# Patient Record
Sex: FEMALE | Race: WHITE | NOT HISPANIC OR LATINO | Employment: FULL TIME | URBAN - METROPOLITAN AREA
[De-identification: names, ages, dates, MRNs, and addresses within clinical notes are randomized per-mention and may not be internally consistent; named-entity substitution may affect disease eponyms.]

---

## 2023-02-06 ENCOUNTER — HOSPITAL ENCOUNTER (OUTPATIENT)
Facility: HOSPITAL | Age: 38
Discharge: HOME | End: 2023-02-06
Attending: EMERGENCY MEDICINE
Payer: COMMERCIAL

## 2023-02-06 VITALS
SYSTOLIC BLOOD PRESSURE: 137 MMHG | TEMPERATURE: 97.9 F | HEART RATE: 74 BPM | DIASTOLIC BLOOD PRESSURE: 90 MMHG | OXYGEN SATURATION: 97 % | RESPIRATION RATE: 18 BRPM | HEIGHT: 63 IN | WEIGHT: 193 LBS | BODY MASS INDEX: 34.2 KG/M2

## 2023-02-06 DIAGNOSIS — M62.838 MUSCLE SPASM: ICD-10-CM

## 2023-02-06 DIAGNOSIS — M54.9 BACK PAIN, UNSPECIFIED BACK LOCATION, UNSPECIFIED BACK PAIN LATERALITY, UNSPECIFIED CHRONICITY: Primary | ICD-10-CM

## 2023-02-06 LAB
BILIRUB UR QL STRIP: NEGATIVE
CLARITY UR: ABNORMAL
COLOR UR: YELLOW
GLUCOSE UR STRIP-MCNC: NEGATIVE MG/DL
HGB UR QL STRIP.AUTO: NEGATIVE
KETONES UR QL STRIP: NEGATIVE
LEUKOCYTE ESTERASE UR QL STRIP.AUTO: NEGATIVE
NITRITE UR QL STRIP: NEGATIVE
PH UR STRIP.AUTO: 6 [PH] (ref 5–8)
PROT UR QL STRIP: ABNORMAL
SP GR UR STRIP: 1.02 (ref 1–1.03)
UROBILINOGEN UR QL STRIP: ABNORMAL

## 2023-02-06 PROCEDURE — 81003 URINALYSIS AUTO W/O SCOPE: CPT | Performed by: EMERGENCY MEDICINE

## 2023-02-06 PROCEDURE — 99203 OFFICE O/P NEW LOW 30 MIN: CPT | Performed by: EMERGENCY MEDICINE

## 2023-02-06 PROCEDURE — G0463 HOSPITAL OUTPT CLINIC VISIT: HCPCS | Performed by: EMERGENCY MEDICINE

## 2023-02-06 PROCEDURE — S9083 URGENT CARE CENTER GLOBAL: HCPCS | Performed by: EMERGENCY MEDICINE

## 2023-02-06 RX ORDER — NAPROXEN 500 MG/1
500 TABLET ORAL 2 TIMES DAILY WITH MEALS
Qty: 10 TABLET | Refills: 0 | Status: SHIPPED | OUTPATIENT
Start: 2023-02-06 | End: 2023-02-11

## 2023-02-06 RX ORDER — CYCLOBENZAPRINE HCL 5 MG
5-10 TABLET ORAL 3 TIMES DAILY PRN
Qty: 20 TABLET | Refills: 0 | Status: SHIPPED | OUTPATIENT
Start: 2023-02-06

## 2023-02-06 NOTE — FSED PROVIDER NOTE
Subjective   History of Present Illness  Patient states that she woke up yesterday morning with lower mid thoracic back pain.  Seems more lateral than midline.  Denies any injury or trauma.  Does not radiate.  No anterior chest symptoms.  No lower back or neck symptoms.  She does work in production and feels as if she would like a work note to go back tomorrow as this issue is bothersome.  It is possible that this may have occurred from work but does not appear to be an obvious answer.  No fevers or IV drug use history reported        Review of Systems   All other systems reviewed and are negative.      History reviewed. No pertinent past medical history.    Allergies   Allergen Reactions   • Bactrim [Sulfamethoxazole-Trimethoprim] Hives       History reviewed. No pertinent surgical history.    History reviewed. No pertinent family history.    Social History     Socioeconomic History   • Marital status:            Objective   Physical Exam  Vitals and nursing note reviewed.   Constitutional:       Appearance: Normal appearance. She is obese.   HENT:      Head: Normocephalic.      Right Ear: External ear normal.      Left Ear: External ear normal.      Nose: Nose normal.   Eyes:      Conjunctiva/sclera: Conjunctivae normal.   Cardiovascular:      Rate and Rhythm: Normal rate.      Pulses: Normal pulses.   Pulmonary:      Effort: Pulmonary effort is normal. No respiratory distress.      Breath sounds: Normal breath sounds. No stridor. No wheezing or rhonchi.   Abdominal:      General: There is no distension.   Musculoskeletal:         General: Tenderness (Mid and lower thoracic paraspinal with spasm) present.      Cervical back: Normal range of motion.   Skin:     Findings: No rash.   Neurological:      Mental Status: She is alert and oriented to person, place, and time.   Psychiatric:         Mood and Affect: Mood normal.         Procedures           ED Course                                           Medical  Decision Making  Likely musculoskeletal.  Patient has palpatory tenderness to the thoracic region.  She has not really tried any medications for this yet.  Offered x-ray but declined.  Probably would not really add much value to diagnostic work-up or treatment management.  She was interested in a work note, Flexeril and Naprosyn though.  Return if worse.  She has no anterior chest symptoms.  Vital signs are good.    Back pain, unspecified back location, unspecified back pain laterality, unspecified chronicity: complicated acute illness or injury  Muscle spasm: complicated acute illness or injury  Risk  Prescription drug management.          Final diagnoses:   Back pain, unspecified back location, unspecified back pain laterality, unspecified chronicity   Muscle spasm       ED Disposition  ED Disposition     ED Disposition   Discharge    Condition   Stable    Comment   --             PATIENT CONNECTION - [place] [id number]  [phone]  Schedule an appointment as soon as possible for a visit   General Care / Primary Care / [address] Dr         Medication List      New Prescriptions    cyclobenzaprine 5 MG tablet  Commonly known as: FLEXERIL  Take 1-2 tablets by mouth 3 (Three) Times a Day As Needed for Muscle Spasms.     naproxen 500 MG EC tablet  Commonly known as: EC NAPROSYN  Take 1 tablet by mouth 2 (Two) Times a Day With Meals for 5 days.           Where to Get Your Medications      These medications were sent to [facility]/pharmacy #[id number] - [place], [place] - [address] [place] [address] - [phone]  - [phone] FX  [address] [place] [address] [place] [address]    Hours: 24-hours Phone: [phone]   · cyclobenzaprine 5 MG tablet  · naproxen 500 MG EC tablet

## 2023-05-18 ENCOUNTER — APPOINTMENT (OUTPATIENT)
Dept: CT IMAGING | Facility: HOSPITAL | Age: 38
End: 2023-05-18
Payer: COMMERCIAL

## 2023-05-18 ENCOUNTER — HOSPITAL ENCOUNTER (EMERGENCY)
Facility: HOSPITAL | Age: 38
Discharge: HOME | End: 2023-05-19
Attending: STUDENT IN AN ORGANIZED HEALTH CARE EDUCATION/TRAINING PROGRAM
Payer: COMMERCIAL

## 2023-05-18 DIAGNOSIS — D25.9 UTERINE LEIOMYOMA, UNSPECIFIED LOCATION: ICD-10-CM

## 2023-05-18 DIAGNOSIS — N93.9 VAGINAL BLEEDING: Primary | ICD-10-CM

## 2023-05-18 LAB
ALBUMIN SERPL-MCNC: 4.2 G/DL (ref 3.5–5.2)
ALBUMIN/GLOB SERPL: 1.2 G/DL
ALP SERPL-CCNC: 114 U/L (ref 39–117)
ALT SERPL W P-5'-P-CCNC: 37 U/L (ref 1–33)
ANION GAP SERPL CALCULATED.3IONS-SCNC: 10.5 MMOL/L (ref 5–15)
AST SERPL-CCNC: 31 U/L (ref 1–32)
B-HCG UR QL: NEGATIVE
BASOPHILS # BLD AUTO: 0.04 10*3/MM3 (ref 0–0.2)
BASOPHILS NFR BLD AUTO: 0.4 % (ref 0–1.5)
BILIRUB SERPL-MCNC: <0.2 MG/DL (ref 0–1.2)
BILIRUB UR QL STRIP: NEGATIVE
BUN SERPL-MCNC: 8 MG/DL (ref 6–20)
BUN/CREAT SERPL: 11.3 (ref 7–25)
CALCIUM SPEC-SCNC: 9.2 MG/DL (ref 8.6–10.5)
CHLORIDE SERPL-SCNC: 103 MMOL/L (ref 98–107)
CLARITY UR: CLEAR
CO2 SERPL-SCNC: 22.5 MMOL/L (ref 22–29)
COLOR UR: ABNORMAL
CREAT SERPL-MCNC: 0.71 MG/DL (ref 0.57–1)
DEPRECATED RDW RBC AUTO: 43.2 FL (ref 37–54)
EGFRCR SERPLBLD CKD-EPI 2021: 111.1 ML/MIN/1.73
EOSINOPHIL # BLD AUTO: 0.36 10*3/MM3 (ref 0–0.4)
EOSINOPHIL NFR BLD AUTO: 3.3 % (ref 0.3–6.2)
ERYTHROCYTE [DISTWIDTH] IN BLOOD BY AUTOMATED COUNT: 13.5 % (ref 12.3–15.4)
GLOBULIN UR ELPH-MCNC: 3.6 GM/DL
GLUCOSE SERPL-MCNC: 95 MG/DL (ref 65–99)
GLUCOSE UR STRIP-MCNC: NEGATIVE MG/DL
HCT VFR BLD AUTO: 39.8 % (ref 34–46.6)
HGB BLD-MCNC: 13.4 G/DL (ref 12–15.9)
HGB UR QL STRIP.AUTO: ABNORMAL
IMM GRANULOCYTES # BLD AUTO: 0.02 10*3/MM3 (ref 0–0.05)
IMM GRANULOCYTES NFR BLD AUTO: 0.2 % (ref 0–0.5)
KETONES UR QL STRIP: NEGATIVE
LEUKOCYTE ESTERASE UR QL STRIP.AUTO: NEGATIVE
LYMPHOCYTES # BLD AUTO: 3 10*3/MM3 (ref 0.7–3.1)
LYMPHOCYTES NFR BLD AUTO: 27.8 % (ref 19.6–45.3)
MCH RBC QN AUTO: 29.1 PG (ref 26.6–33)
MCHC RBC AUTO-ENTMCNC: 33.7 G/DL (ref 31.5–35.7)
MCV RBC AUTO: 86.5 FL (ref 79–97)
MONOCYTES # BLD AUTO: 0.68 10*3/MM3 (ref 0.1–0.9)
MONOCYTES NFR BLD AUTO: 6.3 % (ref 5–12)
NEUTROPHILS NFR BLD AUTO: 6.7 10*3/MM3 (ref 1.7–7)
NEUTROPHILS NFR BLD AUTO: 62 % (ref 42.7–76)
NITRITE UR QL STRIP: NEGATIVE
PH UR STRIP.AUTO: 7 [PH] (ref 5–8)
PLATELET # BLD AUTO: 363 10*3/MM3 (ref 140–450)
PMV BLD AUTO: 10.5 FL (ref 6–12)
POTASSIUM SERPL-SCNC: 3.4 MMOL/L (ref 3.5–5.2)
PROT SERPL-MCNC: 7.8 G/DL (ref 6–8.5)
PROT UR QL STRIP: ABNORMAL
RBC # BLD AUTO: 4.6 10*6/MM3 (ref 3.77–5.28)
SODIUM SERPL-SCNC: 136 MMOL/L (ref 136–145)
SP GR UR STRIP: 1.01 (ref 1–1.03)
UROBILINOGEN UR QL STRIP: ABNORMAL
WBC NRBC COR # BLD: 10.8 10*3/MM3 (ref 3.4–10.8)

## 2023-05-18 PROCEDURE — 81003 URINALYSIS AUTO W/O SCOPE: CPT | Performed by: STUDENT IN AN ORGANIZED HEALTH CARE EDUCATION/TRAINING PROGRAM

## 2023-05-18 PROCEDURE — 96375 TX/PRO/DX INJ NEW DRUG ADDON: CPT

## 2023-05-18 PROCEDURE — 25010000002 MORPHINE PER 10 MG: Performed by: STUDENT IN AN ORGANIZED HEALTH CARE EDUCATION/TRAINING PROGRAM

## 2023-05-18 PROCEDURE — 81025 URINE PREGNANCY TEST: CPT | Performed by: STUDENT IN AN ORGANIZED HEALTH CARE EDUCATION/TRAINING PROGRAM

## 2023-05-18 PROCEDURE — 85025 COMPLETE CBC W/AUTO DIFF WBC: CPT | Performed by: STUDENT IN AN ORGANIZED HEALTH CARE EDUCATION/TRAINING PROGRAM

## 2023-05-18 PROCEDURE — 25010000002 ONDANSETRON PER 1 MG: Performed by: STUDENT IN AN ORGANIZED HEALTH CARE EDUCATION/TRAINING PROGRAM

## 2023-05-18 PROCEDURE — 96374 THER/PROPH/DIAG INJ IV PUSH: CPT

## 2023-05-18 PROCEDURE — 80053 COMPREHEN METABOLIC PANEL: CPT | Performed by: STUDENT IN AN ORGANIZED HEALTH CARE EDUCATION/TRAINING PROGRAM

## 2023-05-18 PROCEDURE — 25010000002 KETOROLAC TROMETHAMINE PER 15 MG: Performed by: STUDENT IN AN ORGANIZED HEALTH CARE EDUCATION/TRAINING PROGRAM

## 2023-05-18 PROCEDURE — 74177 CT ABD & PELVIS W/CONTRAST: CPT

## 2023-05-18 PROCEDURE — 99284 EMERGENCY DEPT VISIT MOD MDM: CPT

## 2023-05-18 PROCEDURE — 96361 HYDRATE IV INFUSION ADD-ON: CPT

## 2023-05-18 RX ORDER — KETOROLAC TROMETHAMINE 15 MG/ML
15 INJECTION, SOLUTION INTRAMUSCULAR; INTRAVENOUS ONCE
Status: COMPLETED | OUTPATIENT
Start: 2023-05-18 | End: 2023-05-18

## 2023-05-18 RX ORDER — ONDANSETRON 2 MG/ML
4 INJECTION INTRAMUSCULAR; INTRAVENOUS ONCE
Status: COMPLETED | OUTPATIENT
Start: 2023-05-18 | End: 2023-05-18

## 2023-05-18 RX ORDER — SODIUM CHLORIDE 0.9 % (FLUSH) 0.9 %
10 SYRINGE (ML) INJECTION AS NEEDED
Status: DISCONTINUED | OUTPATIENT
Start: 2023-05-18 | End: 2023-05-19 | Stop reason: HOSPADM

## 2023-05-18 RX ADMIN — SODIUM CHLORIDE 1000 ML: 9 INJECTION, SOLUTION INTRAVENOUS at 21:31

## 2023-05-18 RX ADMIN — KETOROLAC TROMETHAMINE 15 MG: 15 INJECTION, SOLUTION INTRAMUSCULAR; INTRAVENOUS at 21:32

## 2023-05-18 RX ADMIN — ONDANSETRON 4 MG: 2 INJECTION INTRAMUSCULAR; INTRAVENOUS at 21:31

## 2023-05-18 RX ADMIN — MORPHINE SULFATE 4 MG: 4 INJECTION, SOLUTION INTRAMUSCULAR; INTRAVENOUS at 21:57

## 2023-05-18 NOTE — Clinical Note
[facility] FSED [place] [id number] [place] [address] [place] [address]  Phone: [phone]    Yamel Del Rio was seen and treated in our emergency department on 5/18/2023.  She may return to work on 05/22/2023.         Thank you for choosing [facility].    Adia Higginbotham,        no fever/no nausea/no vomiting

## 2023-05-19 VITALS
HEIGHT: 63 IN | SYSTOLIC BLOOD PRESSURE: 108 MMHG | TEMPERATURE: 97.9 F | WEIGHT: 185 LBS | RESPIRATION RATE: 18 BRPM | DIASTOLIC BLOOD PRESSURE: 69 MMHG | HEART RATE: 79 BPM | OXYGEN SATURATION: 95 % | BODY MASS INDEX: 32.78 KG/M2

## 2023-05-19 PROCEDURE — 25510000001 IOPAMIDOL PER 1 ML: Performed by: STUDENT IN AN ORGANIZED HEALTH CARE EDUCATION/TRAINING PROGRAM

## 2023-05-19 RX ORDER — IBUPROFEN 600 MG/1
600 TABLET ORAL EVERY 6 HOURS PRN
Qty: 30 TABLET | Refills: 0 | Status: SHIPPED | OUTPATIENT
Start: 2023-05-19

## 2023-05-19 RX ADMIN — IOPAMIDOL 100 ML: 755 INJECTION, SOLUTION INTRAVENOUS at 00:02

## 2023-05-19 NOTE — FSED PROVIDER NOTE
Subjective   History of Present Illness  Patient is a 37-year-old female presents emergency department for abdominal pain and vaginal bleeding.  Patient has a history of fibroids underwent D&C 3 years ago.  Patient states yesterday she started with vaginal bleeding, she is going through approximately 3 tampons a day.  She does not take any blood thinners.  Patient is localizing pain to the lower abdomen and left lower quadrant.  She reports nausea denies any vomiting.  Patient has had no fever, chills, chest pain, shortness of breath, flank pain or urinary symptoms.        Review of Systems   Constitutional: Negative for activity change and fever.   HENT: Negative for congestion, rhinorrhea and sore throat.    Respiratory: Negative for cough and shortness of breath.    Cardiovascular: Negative for chest pain.   Gastrointestinal: Positive for abdominal pain and nausea. Negative for vomiting.   Genitourinary: Positive for vaginal bleeding. Negative for dysuria.   Musculoskeletal: Negative for back pain and myalgias.   Skin: Negative for rash.   Neurological: Negative for dizziness, light-headedness and headaches.   Psychiatric/Behavioral: Negative for confusion.       Past Medical History:   Diagnosis Date   • Fibroids        Allergies   Allergen Reactions   • Bactrim [Sulfamethoxazole-Trimethoprim] Hives       Past Surgical History:   Procedure Laterality Date   • ENDOMETRIAL ABLATION         History reviewed. No pertinent family history.    Social History     Socioeconomic History   • Marital status:    Tobacco Use   • Smoking status: Every Day   Substance and Sexual Activity   • Alcohol use: Not Currently   • Drug use: Never           Objective   Physical Exam  Vitals and nursing note reviewed. Exam conducted with a chaperone present.   Constitutional:       General: She is in acute distress (appears uncomfortable secondary to pain).      Appearance: Normal appearance. She is not ill-appearing.   HENT:       Head: Normocephalic and atraumatic.      Right Ear: Tympanic membrane normal.      Left Ear: Tympanic membrane normal.      Nose: Nose normal. No congestion.      Mouth/Throat:      Mouth: Mucous membranes are moist.      Pharynx: No oropharyngeal exudate.   Eyes:      Conjunctiva/sclera: Conjunctivae normal.   Cardiovascular:      Rate and Rhythm: Normal rate and regular rhythm.      Heart sounds: No murmur heard.  Pulmonary:      Effort: Pulmonary effort is normal.      Breath sounds: No wheezing, rhonchi or rales.   Abdominal:      General: Abdomen is protuberant.      Palpations: Abdomen is soft.      Tenderness: There is generalized abdominal tenderness and tenderness in the left lower quadrant. There is guarding. There is no right CVA tenderness, left CVA tenderness or rebound.   Genitourinary:     Cervix: Cervical bleeding present. No cervical motion tenderness, discharge, friability or erythema.      Comments: Patient with small amount of blood within the vaginal vault, there is no brisk bleeding.  No lacerations.  Cervical os is closed, there are no cervical lesions or friability  Musculoskeletal:         General: No swelling or tenderness. Normal range of motion.      Cervical back: Normal range of motion and neck supple.   Skin:     General: Skin is warm and dry.      Findings: No rash.   Neurological:      General: No focal deficit present.      Mental Status: She is alert and oriented to person, place, and time.         Procedures           ED Course  ED Course as of 05/19/23 0057   Thu May 18, 2023   2129 Patient signed out to Dr. Raya pending imaging and lab work [CO]   2137 WBC: 10.80 [CO]   2137 Hemoglobin: 13.4 [CO]      ED Course User Index  [CO] [place] DO                                           Medical Decision Making  Patient is a 37-year-old female presents emergency department for abdominal pain and vaginal bleeding for 2 days.  On arrival she is afebrile, hemodynamically  stable.  Physical examination she does have lower abdominal discomfort along with left lower quadrant abdominal discomfort.  Pelvic exam does show bleeding, but no lacerations no brisk bleeding.  Will obtain lab work, CT imaging.    Patient signed out to oncoming physician pending remaining of patient's work-up.  If no concerning findings on CT scan, hemoglobin stable and patient's pain is well controlled patient will likely be stable for discharge home with OB/GYN and PCP follow-up.    Amount and/or Complexity of Data Reviewed  Labs: ordered. Decision-making details documented in ED Course.  Radiology: ordered.      Risk  Prescription drug management.        Patient turned over to me at change of shift.  The patient was awaiting a CT abdomen pelvis for evaluation of vaginal bleeding.  CT abdomen pelvis demonstrates the following:    FINDINGS:     LOWER CHEST :  The visualized lung bases are clear.  There are no pleural or pericardial effusions.     ABDOMEN:     Liver and Biliary system:  The liver is enlarged measuring 24 cm in craniocaudal dimension. There is moderate diffuse decreased attenuation of the liver which is compatible fatty liver infiltration.     Adrenal glands:  Normal.     Kidneys and ureters: There is a 1.7 cm cyst within the interpolar region of the right kidney. Subcentimeter hypodensities are otherwise seen within the kidneys bilaterally that are too small to fully characterize.     Spleen:  Normal.     Pancreas:  Normal.     Gallbladder:  Normal.     Lymph nodes, Peritoneum and mesentery:  There is no mesenteric or retroperitoneal lymphadenopathy.     Gastrointestinal tract:  There are no dilated loops of bowel or free intraperitoneal air.    The appendix is normal.      Aorta/IVC:   No aortic aneurysm.  IVC normal.     Abdominal wall:  Normal.     PELVIS:     Fluid: There is a small amount of free fluid within the pelvis.     Lymph Nodes:  There is no pelvic or inguinal  lymphadenopathy..     Urinary bladder:  Normal.     BONES:  There are no osseous destructive lesions..     ADDITIONAL  SIGNIFICANT FINDINGS:  There is a 4.9 cm uterine mass that appears somewhat central and likely represents a fibroid with a submucosal component. This would be better evaluated by ultrasound..     IMPRESSION:        1. No acute process seen within the abdomen or pelvis.  2. Probable uterine fibroid as above. This would be better evaluated by ultrasound.  3. Hepatomegaly with diffuse hepatic steatosis.              Electronically signed by:  Sandro Funk D.O.    5/18/2023 10:19 PM Mountain Time    Patient has been advised of urine and fibroids likely causing her abdominal discomfort and vaginal bleeding.  Patient is been advised to follow-up with her primary provider and gynecologist to discuss further treatment options.  Patient states that she does have an appoint with her gynecologist in 1 week.  Patient remained stable at this time.  Patient voiced understanding of this plan.    Final diagnoses:   Vaginal bleeding   Uterine leiomyoma, unspecified location       ED Disposition  ED Disposition     ED Disposition   Discharge    Condition   Stable    Comment   --             No follow-up provider specified.       Medication List      Changed    ibuprofen 600 MG tablet  Commonly known as: ADVIL,MOTRIN  Take 1 tablet by mouth Every 6 (Six) Hours As Needed for Mild Pain.  What changed:   · medication strength  · how much to take  · when to take this  · reasons to take this           Where to Get Your Medications      These medications were sent to [facility]/pharmacy #[id number] - [facility] [place] - [address] [place] [address] - [phone]  - [phone] [address] [place] [address]    Hours: 24-hours Phone: [phone]   · ibuprofen 600 MG tablet

## 2023-05-19 NOTE — DISCHARGE INSTRUCTIONS
Please follow-up with your gynecologist.  Take ibuprofen every 6-8 hours as needed for discomfort.  Seek immediate medical attention if having worsening symptoms or any concerns.

## 2024-01-24 ENCOUNTER — APPOINTMENT (OUTPATIENT)
Dept: GENERAL RADIOLOGY | Facility: HOSPITAL | Age: 39
End: 2024-01-24
Payer: COMMERCIAL

## 2024-01-24 ENCOUNTER — HOSPITAL ENCOUNTER (EMERGENCY)
Facility: HOSPITAL | Age: 39
Discharge: HOME | End: 2024-01-24
Attending: EMERGENCY MEDICINE
Payer: COMMERCIAL

## 2024-01-24 VITALS
SYSTOLIC BLOOD PRESSURE: 144 MMHG | HEART RATE: 76 BPM | OXYGEN SATURATION: 98 % | TEMPERATURE: 97.9 F | WEIGHT: 175 LBS | DIASTOLIC BLOOD PRESSURE: 83 MMHG | HEIGHT: 63 IN | BODY MASS INDEX: 31.01 KG/M2 | RESPIRATION RATE: 16 BRPM

## 2024-01-24 DIAGNOSIS — M54.9 MUSCULOSKELETAL BACK PAIN: Primary | ICD-10-CM

## 2024-01-24 DIAGNOSIS — R05.1 ACUTE COUGH: ICD-10-CM

## 2024-01-24 DIAGNOSIS — Z78.9 ELECTRONIC CIGARETTE USE: ICD-10-CM

## 2024-01-24 LAB
ALBUMIN SERPL-MCNC: 4 G/DL (ref 3.5–5.2)
ALBUMIN/GLOB SERPL: 1.3 G/DL
ALP SERPL-CCNC: 115 U/L (ref 39–117)
ALT SERPL W P-5'-P-CCNC: 54 U/L (ref 1–33)
ANION GAP SERPL CALCULATED.3IONS-SCNC: 6.9 MMOL/L (ref 5–15)
AST SERPL-CCNC: 44 U/L (ref 1–32)
BASOPHILS # BLD AUTO: 0.05 10*3/MM3 (ref 0–0.2)
BASOPHILS NFR BLD AUTO: 0.8 % (ref 0–1.5)
BILIRUB SERPL-MCNC: <0.2 MG/DL (ref 0–1.2)
BUN SERPL-MCNC: 11 MG/DL (ref 6–20)
BUN/CREAT SERPL: 17.5 (ref 7–25)
CALCIUM SPEC-SCNC: 9.3 MG/DL (ref 8.6–10.5)
CHLORIDE SERPL-SCNC: 104 MMOL/L (ref 98–107)
CO2 SERPL-SCNC: 26.1 MMOL/L (ref 22–29)
CREAT SERPL-MCNC: 0.63 MG/DL (ref 0.57–1)
D DIMER PPP FEU-MCNC: 0.33 MCGFEU/ML (ref 0–0.5)
DEPRECATED RDW RBC AUTO: 45.7 FL (ref 37–54)
EGFRCR SERPLBLD CKD-EPI 2021: 115.2 ML/MIN/1.73
EOSINOPHIL # BLD AUTO: 0.35 10*3/MM3 (ref 0–0.4)
EOSINOPHIL NFR BLD AUTO: 5.5 % (ref 0.3–6.2)
ERYTHROCYTE [DISTWIDTH] IN BLOOD BY AUTOMATED COUNT: 13.5 % (ref 12.3–15.4)
FLUAV SUBTYP SPEC NAA+PROBE: NOT DETECTED
FLUBV RNA ISLT QL NAA+PROBE: NOT DETECTED
GLOBULIN UR ELPH-MCNC: 3.2 GM/DL
GLUCOSE SERPL-MCNC: 88 MG/DL (ref 65–99)
HCT VFR BLD AUTO: 42.5 % (ref 34–46.6)
HGB BLD-MCNC: 13.5 G/DL (ref 12–15.9)
IMM GRANULOCYTES # BLD AUTO: 0 10*3/MM3 (ref 0–0.05)
IMM GRANULOCYTES NFR BLD AUTO: 0 % (ref 0–0.5)
LYMPHOCYTES # BLD AUTO: 2.28 10*3/MM3 (ref 0.7–3.1)
LYMPHOCYTES NFR BLD AUTO: 35.5 % (ref 19.6–45.3)
MCH RBC QN AUTO: 29 PG (ref 26.6–33)
MCHC RBC AUTO-ENTMCNC: 31.8 G/DL (ref 31.5–35.7)
MCV RBC AUTO: 91.2 FL (ref 79–97)
MONOCYTES # BLD AUTO: 0.62 10*3/MM3 (ref 0.1–0.9)
MONOCYTES NFR BLD AUTO: 9.7 % (ref 5–12)
NEUTROPHILS NFR BLD AUTO: 3.12 10*3/MM3 (ref 1.7–7)
NEUTROPHILS NFR BLD AUTO: 48.5 % (ref 42.7–76)
NT-PROBNP SERPL-MCNC: <36 PG/ML (ref 0–450)
PLATELET # BLD AUTO: 311 10*3/MM3 (ref 140–450)
PMV BLD AUTO: 10.3 FL (ref 6–12)
POTASSIUM SERPL-SCNC: 3.9 MMOL/L (ref 3.5–5.2)
PROT SERPL-MCNC: 7.2 G/DL (ref 6–8.5)
QT INTERVAL: 423 MS
QTC INTERVAL: 455 MS
RBC # BLD AUTO: 4.66 10*6/MM3 (ref 3.77–5.28)
SARS-COV-2 RNA RESP QL NAA+PROBE: NOT DETECTED
SODIUM SERPL-SCNC: 137 MMOL/L (ref 136–145)
TROPONIN T SERPL HS-MCNC: <6 NG/L
WBC NRBC COR # BLD AUTO: 6.42 10*3/MM3 (ref 3.4–10.8)

## 2024-01-24 PROCEDURE — 83880 ASSAY OF NATRIURETIC PEPTIDE: CPT

## 2024-01-24 PROCEDURE — 84484 ASSAY OF TROPONIN QUANT: CPT

## 2024-01-24 PROCEDURE — 63710000001 PREDNISONE PER 1 MG

## 2024-01-24 PROCEDURE — 87636 SARSCOV2 & INF A&B AMP PRB: CPT | Performed by: EMERGENCY MEDICINE

## 2024-01-24 PROCEDURE — 99284 EMERGENCY DEPT VISIT MOD MDM: CPT

## 2024-01-24 PROCEDURE — 85379 FIBRIN DEGRADATION QUANT: CPT

## 2024-01-24 PROCEDURE — 85025 COMPLETE CBC W/AUTO DIFF WBC: CPT

## 2024-01-24 PROCEDURE — 36415 COLL VENOUS BLD VENIPUNCTURE: CPT

## 2024-01-24 PROCEDURE — 80053 COMPREHEN METABOLIC PANEL: CPT

## 2024-01-24 PROCEDURE — 93010 ELECTROCARDIOGRAM REPORT: CPT | Performed by: EMERGENCY MEDICINE

## 2024-01-24 PROCEDURE — 93005 ELECTROCARDIOGRAM TRACING: CPT

## 2024-01-24 PROCEDURE — 71046 X-RAY EXAM CHEST 2 VIEWS: CPT

## 2024-01-24 RX ORDER — PREDNISONE 20 MG/1
60 TABLET ORAL ONCE
Status: COMPLETED | OUTPATIENT
Start: 2024-01-24 | End: 2024-01-24

## 2024-01-24 RX ORDER — PREDNISONE 20 MG/1
20 TABLET ORAL DAILY
Qty: 5 TABLET | Refills: 0 | Status: SHIPPED | OUTPATIENT
Start: 2024-01-24 | End: 2024-01-29

## 2024-01-24 RX ORDER — METHOCARBAMOL 750 MG/1
750 TABLET, FILM COATED ORAL 3 TIMES DAILY PRN
Qty: 12 TABLET | Refills: 0 | Status: SHIPPED | OUTPATIENT
Start: 2024-01-24

## 2024-01-24 RX ORDER — BENZONATATE 100 MG/1
100 CAPSULE ORAL 3 TIMES DAILY PRN
Qty: 12 CAPSULE | Refills: 0 | Status: SHIPPED | OUTPATIENT
Start: 2024-01-24

## 2024-01-24 RX ORDER — METHOCARBAMOL 500 MG/1
500 TABLET, FILM COATED ORAL ONCE
Status: COMPLETED | OUTPATIENT
Start: 2024-01-24 | End: 2024-01-24

## 2024-01-24 RX ADMIN — PREDNISONE 60 MG: 20 TABLET ORAL at 10:37

## 2024-01-24 RX ADMIN — METHOCARBAMOL 500 MG: 500 TABLET ORAL at 10:37

## 2024-01-24 NOTE — Clinical Note
[facility] FSED [place] [address] [place] [address] [place] [address]  Phone: [phone]    Yamel Del Rio was seen and treated in our emergency department on 1/24/2024.  She may return to work on 01/26/2024.         Thank you for choosing [facility].    Tracy Rodrigues APRN

## 2024-01-24 NOTE — FSED PROVIDER NOTE
[facility]    EMERGENCY DEPARTMENT ENCOUNTER    Room Number:  05/05  Date seen:  1/24/2024  Time seen: 10:27 EST  PCP: Provider, No Known  Historian: Patient    HPI:  Chief complaint: Cough  Context:Yamel Del Rio is a 38 y.o. female who presents to the ED with c/o cough.  Patient reports that she has been having a nonproductive cough that started yesterday.  Reports that she has been having bodyaches and a low-grade fever as well.  She reports that she has some pain between her shoulder blades when she coughs and when she moves.  Patient reports that she does frequently get pneumonia and was concerned so she wanted to be evaluated today.  Patient also reports that she has been having some shortness of breath when she walks long distances.  Patient reports that she does use an e-cigarette with nicotine.  Patient is nontoxic in appearance.    Timing: Constant  Duration: Yesterday  Location: Chest  Radiation: Nonradiating  Quality: Soreness  Intensity/Severity: Mild  Associated Symptoms: Cough, body aches, low-grade fever, pain with coughing and movement, shortness of breath  Aggravating Factors: Movement, coughing  Alleviating Factors: No known alleviating      MEDICAL RECORD REVIEW  Fibroids    ALLERGIES  Bactrim [sulfamethoxazole-trimethoprim]    PAST MEDICAL HISTORY  Active Ambulatory Problems     Diagnosis Date Noted    No Active Ambulatory Problems     Resolved Ambulatory Problems     Diagnosis Date Noted    No Resolved Ambulatory Problems     Past Medical History:   Diagnosis Date    Fibroids        PAST SURGICAL HISTORY  Past Surgical History:   Procedure Laterality Date    ENDOMETRIAL ABLATION         FAMILY HISTORY  History reviewed. No pertinent family history.    SOCIAL HISTORY  Social History     Socioeconomic History    Marital status:    Tobacco Use    Smoking status: Every Day   Substance and Sexual Activity    Alcohol use: Not Currently    Drug use:  Never       REVIEW OF SYSTEMS  Review of Systems    All systems reviewed and negative except for those discussed in HPI.     PHYSICAL EXAM    I have reviewed the triage vital signs and nursing notes.    ED Triage Vitals [01/24/24 0957]   Temp Heart Rate Resp BP SpO2   97.9 °F (36.6 °C) 88 16 144/83 97 %      Temp src Heart Rate Source Patient Position BP Location FiO2 (%)   Oral Monitor -- -- --       Physical Exam  Constitutional:       Appearance: Normal appearance. She is not toxic-appearing.   HENT:      Right Ear: Tympanic membrane and ear canal normal.      Left Ear: Tympanic membrane and ear canal normal.      Nose: Nose normal.      Mouth/Throat:      Mouth: Mucous membranes are moist.   Eyes:      Conjunctiva/sclera: Conjunctivae normal.      Pupils: Pupils are equal, round, and reactive to light.   Cardiovascular:      Rate and Rhythm: Normal rate and regular rhythm.      Pulses: Normal pulses.      Heart sounds: Normal heart sounds.   Pulmonary:      Effort: Pulmonary effort is normal.      Breath sounds: Normal breath sounds.   Musculoskeletal:      Cervical back: Tenderness present. No swelling, deformity or bony tenderness.      Thoracic back: Tenderness present. No swelling, deformity or bony tenderness.      Lumbar back: Tenderness present. No swelling, deformity or bony tenderness. Negative right straight leg raise test and negative left straight leg raise test.      Comments: Increased pain with movement   Skin:     General: Skin is warm.   Neurological:      General: No focal deficit present.      Mental Status: She is alert.   Psychiatric:         Mood and Affect: Mood normal.         Behavior: Behavior normal.         Vital signs and nursing notes reviewed.        LAB RESULTS  Recent Results (from the past 24 hour(s))   COVID-19 and FLU A/B PCR, 1 HR TAT - Swab, Nasopharynx    Collection Time: 01/24/24  9:58 AM    Specimen: Nasopharynx; Swab   Result Value Ref Range    COVID19 Not Detected Not  Detected - Ref. Range    Influenza A PCR Not Detected Not Detected    Influenza B PCR Not Detected Not Detected   ECG 12 Lead Dyspnea    Collection Time: 01/24/24 10:48 AM   Result Value Ref Range    QT Interval 423 ms    QTC Interval 455 ms   Comprehensive Metabolic Panel    Collection Time: 01/24/24 11:00 AM    Specimen: Blood   Result Value Ref Range    Glucose 88 65 - 99 mg/dL    BUN 11 6 - 20 mg/dL    Creatinine 0.63 0.57 - 1.00 mg/dL    Sodium 137 136 - 145 mmol/L    Potassium 3.9 3.5 - 5.2 mmol/L    Chloride 104 98 - 107 mmol/L    CO2 26.1 22.0 - 29.0 mmol/L    Calcium 9.3 8.6 - 10.5 mg/dL    Total Protein 7.2 6.0 - 8.5 g/dL    Albumin 4.0 3.5 - 5.2 g/dL    ALT (SGPT) 54 (H) 1 - 33 U/L    AST (SGOT) 44 (H) 1 - 32 U/L    Alkaline Phosphatase 115 39 - 117 U/L    Total Bilirubin <0.2 0.0 - 1.2 mg/dL    Globulin 3.2 gm/dL    A/G Ratio 1.3 g/dL    BUN/Creatinine Ratio 17.5 7.0 - 25.0    Anion Gap 6.9 5.0 - 15.0 mmol/L    eGFR 115.2 >60.0 mL/min/1.73   Single High Sensitivity Troponin T    Collection Time: 01/24/24 11:00 AM    Specimen: Blood   Result Value Ref Range    HS Troponin T <6 <14 ng/L   D-dimer, Quantitative    Collection Time: 01/24/24 11:00 AM    Specimen: Blood   Result Value Ref Range    D-Dimer, Quantitative 0.33 0.00 - 0.50 MCGFEU/mL   CBC Auto Differential    Collection Time: 01/24/24 11:00 AM    Specimen: Blood   Result Value Ref Range    WBC 6.42 3.40 - 10.80 10*3/mm3    RBC 4.66 3.77 - 5.28 10*6/mm3    Hemoglobin 13.5 12.0 - 15.9 g/dL    Hematocrit 42.5 34.0 - 46.6 %    MCV 91.2 79.0 - 97.0 fL    MCH 29.0 26.6 - 33.0 pg    MCHC 31.8 31.5 - 35.7 g/dL    RDW 13.5 12.3 - 15.4 %    RDW-SD 45.7 37.0 - 54.0 fl    MPV 10.3 6.0 - 12.0 fL    Platelets 311 140 - 450 10*3/mm3    Neutrophil % 48.5 42.7 - 76.0 %    Lymphocyte % 35.5 19.6 - 45.3 %    Monocyte % 9.7 5.0 - 12.0 %    Eosinophil % 5.5 0.3 - 6.2 %    Basophil % 0.8 0.0 - 1.5 %    Immature Grans % 0.0 0.0 - 0.5 %    Neutrophils, Absolute 3.12 1.70  - 7.00 10*3/mm3    Lymphocytes, Absolute 2.28 0.70 - 3.10 10*3/mm3    Monocytes, Absolute 0.62 0.10 - 0.90 10*3/mm3    Eosinophils, Absolute 0.35 0.00 - 0.40 10*3/mm3    Basophils, Absolute 0.05 0.00 - 0.20 10*3/mm3    Immature Grans, Absolute 0.00 0.00 - 0.05 10*3/mm3   BNP    Collection Time: 01/24/24 11:00 AM    Specimen: Blood   Result Value Ref Range    proBNP <36.0 0.0 - 450.0 pg/mL       Ordered the above labs and independently reviewed the results.      RADIOLOGY RESULTS  XR Chest 2 View    Result Date: 1/24/2024  XR CHEST 2 VW Date of Exam: 1/24/2024 10:07 AM EST Indication: cough Comparison: Chest x-ray dated 2/5/2023 Findings: The lungs appear adequately aerated without consolidation or mass. No pleural effusion or pneumothorax is identified. The cardiomediastinal silhouette and pulmonary vasculature appear within normal limits. No acute or suspicious osseous lesion is identified.     Impression: 1.No acute radiographic abnormality is identified. Electronically Signed: Celestino Xie MD  1/24/2024 10:14 AM EST  Workstation ID: [id number]        I ordered the above noted radiological studies. Independently reviewed by me and discussed with radiologist.  See dictation above for official radiology interpretation.      Orders placed during this visit:  Orders Placed This Encounter   Procedures    COVID-19 and FLU A/B PCR, 1 HR TAT - Swab, Nasopharynx    XR Chest 2 View    Comprehensive Metabolic Panel    Single High Sensitivity Troponin T    D-dimer, Quantitative    CBC Auto Differential    BNP    ECG 12 Lead Dyspnea    CBC & Differential    ED Acknowledgement Form Needed;           PROCEDURES    Procedures        MEDICATIONS GIVEN IN ER    Medications   methocarbamol (ROBAXIN) tablet 500 mg (500 mg Oral Given 1/24/24 1037)   predniSONE (DELTASONE) tablet 60 mg (60 mg Oral Given 1/24/24 1037)         PROGRESS, DATA ANALYSIS, CONSULTS, AND MEDICAL DECISION MAKING    All labs have been independently reviewed by  me.  All radiology studies have been reviewed by me.   EKG's independently reviewed by me.  Discussion below represents my analysis of pertinent findings related to patient's condition, differential diagnosis, treatment plan and final disposition.    I rechecked the patient.  I discussed the patient's labs, radiology findings (including all incidental findings), diagnosis, and plan for discharge.  A repeat exam reveals no new worrisome changes from my initial exam findings.  The patient understands that the fact that they are being discharged does not denote that nothing is abnormal, it indicates that no clinical emergency is present and that they must follow-up as directed in order to properly maintain their health.  Follow-up instructions (specifically listed below) and return to ER precautions were given at this time.  I specifically instructed the patient to follow-up with their PCP.  The patient understands and agrees with the plan, and is ready for discharge.  All questions answered.    ED Course as of 01/24/24 1145   Wed Jan 24, 2024   1017 XR Chest 2 View  Impression:  1.No acute radiographic abnormality is identified.  Electronically Signed: Celestino Xie MD    1/24/2024 10:14 AM EST  [KJ]   1054 COVID19: Not Detected [KJ]   1055 Influenza A PCR: Not Detected [KJ]   1055 Influenza B PCR: Not Detected [KJ]   1144 Upon reevaluation the patient states that she is feeling better.  We discussed her discharge instructions. [KJ]      ED Course User Index  [KJ] Tracy Rodrigues, ZOË       AS OF 11:45 EST VITALS:    BP - 144/83  HR - 76  TEMP - 97.9 °F (36.6 °C) (Oral)  02 SATS - 98%    Medical Decision Making  MEDICAL DECISION  Radiology interpretation:  Images reviewed and interpreted radiology images , chest x-ray negative    Patient is a 38-year-old female who presents today with pain with deep breathing, back pain and cough.  Exam without evidence of volume overload so doubt heart failure. EKG without  signs of active ischemia. Given the timing of pain to ER presentation, troponin was negative doubt NSTEMI. Presentation not consistent with acute PE (D-dimer negative),pneumothorax (not visualized on chest xr), thoracic aortic dissection, pericarditis, tamponade, pneumonia (no infectious symptoms, clear chest xr), myocarditis (no recent illness, neg trop). HEART score:1; discharge patient home.  I do think the patient's cough is likely viral in nature.  Her COVID and influenza are negative as well.  Patient did have improvement after Robaxin and prednisone.  I will send her home with a prescription for Robaxin prednisone and Tessalon Perles.  I encouraged the patient to follow-up with her primary care provider over the next week for continued evaluation.  She was given strict return precautions with understanding.  Patient was provided a work note.    Problems Addressed:  Acute cough: complicated acute illness or injury  Electronic cigarette use: complicated acute illness or injury  Musculoskeletal back pain: complicated acute illness or injury    Amount and/or Complexity of Data Reviewed  Labs: ordered. Decision-making details documented in ED Course.  Radiology: ordered. Decision-making details documented in ED Course.  ECG/medicine tests: ordered.    Risk  Prescription drug management.          DIAGNOSIS  Final diagnoses:   Musculoskeletal back pain   Acute cough   Electronic cigarette use       New Medications Ordered This Visit   Medications    methocarbamol (ROBAXIN) tablet 500 mg    predniSONE (DELTASONE) tablet 60 mg    methocarbamol (ROBAXIN) 750 MG tablet     Sig: Take 1 tablet by mouth 3 (Three) Times a Day As Needed for Muscle Spasms.     Dispense:  12 tablet     Refill:  0    predniSONE (DELTASONE) 20 MG tablet     Sig: Take 1 tablet by mouth Daily for 5 days.     Dispense:  5 tablet     Refill:  0    benzonatate (TESSALON) 100 MG capsule     Sig: Take 1 capsule by mouth 3 (Three) Times a Day As Needed  for Cough.     Dispense:  12 capsule     Refill:  0           I performed hand hygiene on entry into the pt room and upon exit.     Note Disclaimer: At [facility], we believe that sharing information builds trust and better relationships. You are receiving this note because you recently visited [facility]. It is possible you will see health information before a provider has talked with you about it. This kind of information can be easy to misunderstand. To help you fully understand what it means for your health, we urge you to discuss this note with your provider.         Part of this note may be an electronic transcription/translation of spoken language to printed text using the Dragon Dictation System.     Appropriate PPE worn during exam.    Dictated utilizing Dragon dictation     Note Disclaimer: At [facility], we believe that sharing information builds trust and better relationships. You are receiving this note because you recently visited [facility]. It is possible you will see health information before a provider has talked with you about it. This kind of information can be easy to misunderstand. To help you fully understand what it means for your health, we urge you to discuss this note with your provider.            No

## 2024-01-24 NOTE — DISCHARGE INSTRUCTIONS
Thank you for letting us care for you today.  You can use Tylenol as needed for pain and fever.  Drink plenty fluids and get rest.  You can use over-the-counter medications as needed for your symptoms such as DayQuil, NyQuil, Mucinex, Cepacol throat lozenges, cool-mist humidifier etc.  Follow-up with your primary care provider.  Return for any new or worsening symptoms.  Take the Robaxin as prescribed.  Use the prednisone as prescribed for the next 5 days.  You can also apply ice or heat to the sore area for 20 minutes at a time.      Wash/sanitize common household surfaces with antibacterial wipes.  Especially door knobs, light switches. Change bed linens and wash bath towels/washcloths. Frequent handwashing. Cough/sneeze into your sleeve. Treat fever every 6-8 hours with adult/children Tylenol (generic acetaminophen)    HOME CARE INSTRUCTIONS: For many people, back pain returns. Since low back pain is rarely dangerous, it is often a condition that people can learn to manage on their own. Please remain active. It is stressful on the back to sit or  one place. Do not sit, drive, or  one place for more than 30 minutes at a time. Take short walks on level surfaces as soon as pain allows. Try to increase the length of time you walk each day. Do not stay in bed. Resting more than 1 or 2 days can delay your recovery. Do not avoid exercise or work. Your body is made to move. It is not dangerous to be active, even though your back may hurt. Your back will likely heal faster if you return to being active before your pain is gone. Only take over-the-counter or prescription medicines as directed by your caregiver. Over-the-counter medicines to reduce pain and inflammation are often the most helpful. Your caregiver may prescribe muscle relaxant drugs. These medicines help dull your pain so you can more quickly return to your normal activities and healthy exercise. Please avoid driving, operating heavy machinery  or making important decisions while on this drug - it can cloud your judgment. Avoid feeling anxious or stressed. Stress increases muscle tension and can worsen back pain. It is important to recognize when you are anxious or stressed and learn ways to manage it. Exercise is a great option. SEEK MEDICAL CARE IF: You have pain that is not relieved with rest or medicine. You have pain that does not improve in 1 week. You have new symptoms. You are generally not feeling well. SEEK IMMEDIATE MEDICAL CARE IF: You have pain that radiates from your back into your legs. You develop new bowel or bladder control problems. You have unusual weakness or numbness in your arms or legs. You develop nausea or vomiting. You develop abdominal pain. You feel faint.